# Patient Record
Sex: FEMALE | Race: WHITE | HISPANIC OR LATINO | Employment: FULL TIME | URBAN - METROPOLITAN AREA
[De-identification: names, ages, dates, MRNs, and addresses within clinical notes are randomized per-mention and may not be internally consistent; named-entity substitution may affect disease eponyms.]

---

## 2024-06-28 DIAGNOSIS — Z86.010 HX OF ADENOMATOUS POLYP OF COLON: Primary | ICD-10-CM

## 2024-07-08 ENCOUNTER — TELEPHONE (OUTPATIENT)
Dept: UROLOGY | Facility: HOSPITAL | Age: 59
End: 2024-07-08
Payer: COMMERCIAL

## 2024-07-09 ENCOUNTER — APPOINTMENT (OUTPATIENT)
Dept: UROLOGY | Facility: HOSPITAL | Age: 59
End: 2024-07-09
Payer: COMMERCIAL

## 2024-07-12 DIAGNOSIS — Z86.010 HX OF ADENOMATOUS POLYP OF COLON: Primary | ICD-10-CM

## 2024-07-12 RX ORDER — SODIUM, POTASSIUM,MAG SULFATES 17.5-3.13G
SOLUTION, RECONSTITUTED, ORAL ORAL
Qty: 354 ML | Refills: 0 | Status: SHIPPED | OUTPATIENT
Start: 2024-07-12

## 2024-07-15 ENCOUNTER — APPOINTMENT (OUTPATIENT)
Dept: UROLOGY | Facility: HOSPITAL | Age: 59
End: 2024-07-15
Payer: COMMERCIAL

## 2024-07-18 ENCOUNTER — APPOINTMENT (OUTPATIENT)
Dept: UROLOGY | Facility: HOSPITAL | Age: 59
End: 2024-07-18
Payer: COMMERCIAL

## 2024-07-29 NOTE — PROGRESS NOTES
[facility]  Outpatient Clinic Note    Patient Name:  Clarice Vick  MRN:  [id number]  :  1965  Date of Service: 2024     Visit type: Follow up visit    Last seen - 3/30/24 with Dr. Delia SEGOVIA Problem list/Chief complaints:  Gross hematuria- Cystoscopies negative  Recurrent UTIs    HPI    Interval History:  Clarice Vick is a 59 y.o. female who is being seen today for for follow up. I performed a detailed review of the medical chart records lab testing and imaging since last visit. Patient has no urinary complaints today. Denies hematuria, dysuria, flank pain, and bothersome frequency or urgency, no fever or chills.     IPSS: 2  QOL: 0      Past Medical History:   Diagnosis Date    Combined forms of age-related cataract, bilateral 2019    Combined form of age-related cataract, both eyes    Encounter for gynecological examination (general) (routine) without abnormal findings 2022    Encounter for well woman exam    Encounter for other screening for malignant neoplasm of breast     Screening breast examination    Encounter for screening for malignant neoplasm of colon 2019    Colon cancer screening    Personal history of other medical treatment 2022    History of screening mammography       Past Surgical History:   Procedure Laterality Date    MR HEAD ANGIO WO IV CONTRAST  3/3/2017    MR HEAD ANGIO WO IV CONTRAST 3/3/2017 [facility]CILLARY LEGAC[facility]       Social History     Socioeconomic History    Marital status:      Spouse name: Not on file    Number of children: Not on file    Years of education: Not on file    Highest education level: Not on file   Occupational History    Not on file   Tobacco Use    Smoking status: Never    Smokeless tobacco: Never   Substance and Sexual Activity    Alcohol use: Not on file    Drug use: Not on file    Sexual activity: Not on file   Other Topics Concern    Not on file   Social History Narrative    Not on file     Social Determinants  of Health     Financial Resource Strain: Not on file   Food Insecurity: Not on file   Transportation Needs: Not on file   Physical Activity: Not on file   Stress: Not on file   Social Connections: Not on file   Intimate Partner Violence: Not on file   Housing Stability: Not on file       No Known Allergies       Current Outpatient Medications:     estradiol (Estrace) 0.01 % (0.1 mg/gram) vaginal cream, Apply pea-sized amount 3 times per week nightly, Disp: 382.5 g, Rfl: 3     Review of system:  All other systems have been reviewed and are negative for complaints      Last recorded vitals:  Blood pressure 130/81.    Physical Exam:  General: Appears comfortable and in no apparent distress, well nourished  Head: Normocephalic, atraumatic  Eyes: Non-injected conjunctiva, sclera clear, no proptosis  Lungs: Breathing is easy, non-labored. Speaking in clear and complete sentences. Normal diaphragmatic movement.  Cardiovascular: no peripheral edema, cyanosis or pallor.   Abdomen: soft, non-distended, non-tender  : Bladder: non tender, not distended  MSK: Ambulatory with steady gait, unassisted  Skin: Poison ivy rash on bilateral arms  Neurologic: Alert, oriented to person, place, and time  Psychiatric: mood and affect appropriate      Labs  Lab Results   Component Value Date    GLUCOSE 102 (H) 01/20/2023    CALCIUM 9.9 01/20/2023     01/20/2023    K 4.6 01/20/2023    CO2 34 (H) 01/20/2023     01/20/2023    BUN 17 01/20/2023    CREATININE 0.81 01/20/2023     Office Visit on 08/02/2024   Component Date Value    POC Color, Urine 08/02/2024 Yellow     POC Appearance, Urine 08/02/2024 Clear     POC Glucose, Urine 08/02/2024 NEGATIVE     POC Bilirubin, Urine 08/02/2024 NEGATIVE     POC Ketones, Urine 08/02/2024 NEGATIVE     POC Specific Gravity, Ur* 08/02/2024 1.010     POC Blood, Urine 08/02/2024 MODERATE (2+) (A)     POC PH, Urine 08/02/2024 6.5     POC Protein, Urine 08/02/2024 NEGATIVE     POC Urobilinogen,  Urine 08/02/2024 0.2     Poc Nitrite, Urine 08/02/2024 NEGATIVE     POC Leukocytes, Urine 08/02/2024 SMALL (1+) (A)          Imaging  === 01/24/23 ===    CT UROGRAPHY WITH 3D VOLUME RENDERED IMAGING    - Impression -  1. Mild diffuse urinary bladder wall thickening with associated fat  stranding, concerning for cystitis for correlation with urinalysis..  2. Otherwise unremarkable CT urogram with no nephrolithiasis,  enhancing masses, intraluminal filling defect, or hydronephrosis.  3. Additional findings as above.    I personally reviewed the images/study and I agree with the findings  as stated. This study was interpreted at [facility], [place], [place].        Assessment and Plan:  Clarice Vick is a 59 y.o. female with history of cataracts, gross hematuria, UTIs who presents for follow up.    1.Today we discussed UTI in great detail. We discussed that a definitive diagnosis of UTI requires urine culture. Recurrent UTI occurs when treatment with antibiotics causes a positive urine culture to turn negative, but then culture becomes positive again. Recurrent UTI arises either from persistent bacteria inside of the  tract, ex/ urolithiasis, foreign bodies, diverticuli or bacterial prostatitis, or from bacteria outside the  tract, ex/ fistula, urinary retention, vesicoureteral reflux, immunosuppression, sexual intercourse).     We will send urine today for analysis and culture. Will treat as clinically indicated. A standing urine culture has been placed in the  system. If you develop symptoms of UTI, please go to any  lab and drop a urine specimen. You will be contacted with results 2-3 days after drop off.    We discussed UTI prevention in great detail. It is recommended to increase daily water intake to at least 80 ounces per day. It is important to have smooth, daily bowel movements and good bowel health. If you are not, you may take Miralax 17g daily to help with bowel  movements. Do not take this if you are having watery or loose stools. You may consider taking a cranberry supplement or D-Mannose daily to prevent bacteria from adhering to your bladder wall.     I recommend taking a daily probiotic for good vaginal fredi health. We may need to improve the quality of your vaginal tissues, to prevent bacteria from getting into your bladder. We discussed use vaginal estrogen cream. Will try Estrace cream, explained. She denies personal history of breast or gynecological cancer. I explained that small amounts of estrogen can be detected in the blood with use of Estrace cream and that the patient should follow up regularly for breast and gynecological cancer screening.    2. We discussed the finding of asymptomatic microscopic hematuria. We reviewed the AUA guideline. We reviewed the definition of microhematuria as >3 RBCs/hpf on a microscopic evaluation of a single, properly collected urine specimen. We discussed the risk stratification of patients with microhematuria into low, intermediate, and high risk categories.      For low risk patients, we discussed shared decision-making to decide between repeat UA within 6 mo or proceeding with cystoscopy and renal US. For patients who elect for repeat UA, persistent microhematuria should be reclassified as intermediate or high risk.     Plan:  -UA, microscopic, culture and sensitivity ordered to rule out infection or microhematuria- will call with results  -Prescription for Estrace cream sent to patient's pharmacy    Follow-up in 6 months, or sooner if needed, to reassess symptoms and for medication refill.    All questions and concerns were addressed. Patient verbalizes understanding and has no other questions at this time.     Some elements copied from Dr. Lin's note on 3/30/23, the elements have been updated and all reflect current decision making from today, 08/02/24    E&M visit today is associated with current or anticipated ongoing  medical care services related to a patient's single, serious condition or a complex condition.    CRISTINA Esposito-CNP   [facility]  08/02/24 10:11 AM

## 2024-08-02 ENCOUNTER — OFFICE VISIT (OUTPATIENT)
Dept: UROLOGY | Facility: HOSPITAL | Age: 59
End: 2024-08-02
Payer: COMMERCIAL

## 2024-08-02 ENCOUNTER — LAB (OUTPATIENT)
Dept: LAB | Facility: LAB | Age: 59
End: 2024-08-02
Payer: COMMERCIAL

## 2024-08-02 VITALS — SYSTOLIC BLOOD PRESSURE: 130 MMHG | DIASTOLIC BLOOD PRESSURE: 81 MMHG

## 2024-08-02 DIAGNOSIS — Z87.440 HISTORY OF RECURRENT UTIS: ICD-10-CM

## 2024-08-02 DIAGNOSIS — R39.9 LOWER URINARY TRACT SYMPTOMS (LUTS): ICD-10-CM

## 2024-08-02 DIAGNOSIS — R31.0 GROSS HEMATURIA: Primary | ICD-10-CM

## 2024-08-02 LAB
POC APPEARANCE, URINE: CLEAR
POC BILIRUBIN, URINE: NEGATIVE
POC BLOOD, URINE: ABNORMAL
POC COLOR, URINE: YELLOW
POC GLUCOSE, URINE: NEGATIVE MG/DL
POC KETONES, URINE: NEGATIVE MG/DL
POC LEUKOCYTES, URINE: ABNORMAL
POC NITRITE,URINE: NEGATIVE
POC PH, URINE: 6.5 PH
POC PROTEIN, URINE: NEGATIVE MG/DL
POC SPECIFIC GRAVITY, URINE: 1.01
POC UROBILINOGEN, URINE: 0.2 EU/DL
RBC #/AREA URNS AUTO: NORMAL /HPF
SQUAMOUS #/AREA URNS AUTO: NORMAL /HPF
WBC #/AREA URNS AUTO: NORMAL /HPF

## 2024-08-02 PROCEDURE — 87086 URINE CULTURE/COLONY COUNT: CPT

## 2024-08-02 PROCEDURE — 99214 OFFICE O/P EST MOD 30 MIN: CPT | Performed by: NURSE PRACTITIONER

## 2024-08-02 PROCEDURE — 1036F TOBACCO NON-USER: CPT | Performed by: NURSE PRACTITIONER

## 2024-08-02 PROCEDURE — G2211 COMPLEX E/M VISIT ADD ON: HCPCS | Performed by: NURSE PRACTITIONER

## 2024-08-02 PROCEDURE — 81001 URINALYSIS AUTO W/SCOPE: CPT

## 2024-08-02 PROCEDURE — 81003 URINALYSIS AUTO W/O SCOPE: CPT | Performed by: NURSE PRACTITIONER

## 2024-08-02 RX ORDER — ESTRADIOL 0.1 MG/G
CREAM VAGINAL
Qty: 382.5 G | Refills: 3 | Status: SHIPPED | OUTPATIENT
Start: 2024-08-02

## 2024-08-02 ASSESSMENT — PAIN SCALES - GENERAL: PAINLEVEL: 0-NO PAIN

## 2024-08-03 LAB — BACTERIA UR CULT: NORMAL

## 2024-08-27 ENCOUNTER — HOSPITAL ENCOUNTER (OUTPATIENT)
Dept: GASTROENTEROLOGY | Facility: HOSPITAL | Age: 59
Setting detail: OUTPATIENT SURGERY
Discharge: HOME | End: 2024-08-27
Payer: COMMERCIAL

## 2024-08-27 VITALS
SYSTOLIC BLOOD PRESSURE: 98 MMHG | RESPIRATION RATE: 17 BRPM | DIASTOLIC BLOOD PRESSURE: 74 MMHG | WEIGHT: 117 LBS | BODY MASS INDEX: 21.53 KG/M2 | HEIGHT: 62 IN | OXYGEN SATURATION: 99 % | TEMPERATURE: 97.8 F | HEART RATE: 66 BPM

## 2024-08-27 DIAGNOSIS — Z86.010 HX OF ADENOMATOUS POLYP OF COLON: ICD-10-CM

## 2024-08-27 PROCEDURE — 3700000013 HC SEDATION LEVEL 5+ TIME - EACH ADDITIONAL 15 MINUTES

## 2024-08-27 PROCEDURE — 7100000010 HC PHASE TWO TIME - EACH INCREMENTAL 1 MINUTE

## 2024-08-27 PROCEDURE — 99153 MOD SED SAME PHYS/QHP EA: CPT | Performed by: INTERNAL MEDICINE

## 2024-08-27 PROCEDURE — 99152 MOD SED SAME PHYS/QHP 5/>YRS: CPT | Performed by: INTERNAL MEDICINE

## 2024-08-27 PROCEDURE — 3700000012 HC SEDATION LEVEL 5+ TIME - INITIAL 15 MINUTES 5/> YEARS

## 2024-08-27 PROCEDURE — 7100000009 HC PHASE TWO TIME - INITIAL BASE CHARGE

## 2024-08-27 PROCEDURE — 2500000004 HC RX 250 GENERAL PHARMACY W/ HCPCS (ALT 636 FOR OP/ED): Performed by: INTERNAL MEDICINE

## 2024-08-27 PROCEDURE — 45380 COLONOSCOPY AND BIOPSY: CPT | Performed by: INTERNAL MEDICINE

## 2024-08-27 RX ORDER — FENTANYL CITRATE 50 UG/ML
INJECTION, SOLUTION INTRAMUSCULAR; INTRAVENOUS AS NEEDED
Status: COMPLETED | OUTPATIENT
Start: 2024-08-27 | End: 2024-08-27

## 2024-08-27 RX ORDER — SODIUM CHLORIDE, SODIUM LACTATE, POTASSIUM CHLORIDE, CALCIUM CHLORIDE 600; 310; 30; 20 MG/100ML; MG/100ML; MG/100ML; MG/100ML
20 INJECTION, SOLUTION INTRAVENOUS CONTINUOUS
Status: DISCONTINUED | OUTPATIENT
Start: 2024-08-27 | End: 2024-08-28 | Stop reason: HOSPADM

## 2024-08-27 RX ORDER — MIDAZOLAM HYDROCHLORIDE 1 MG/ML
INJECTION, SOLUTION INTRAMUSCULAR; INTRAVENOUS AS NEEDED
Status: COMPLETED | OUTPATIENT
Start: 2024-08-27 | End: 2024-08-27

## 2024-08-27 ASSESSMENT — PAIN SCALES - GENERAL
PAINLEVEL_OUTOF10: 0 - NO PAIN

## 2024-08-27 ASSESSMENT — COLUMBIA-SUICIDE SEVERITY RATING SCALE - C-SSRS
6. HAVE YOU EVER DONE ANYTHING, STARTED TO DO ANYTHING, OR PREPARED TO DO ANYTHING TO END YOUR LIFE?: NO
1. IN THE PAST MONTH, HAVE YOU WISHED YOU WERE DEAD OR WISHED YOU COULD GO TO SLEEP AND NOT WAKE UP?: NO
2. HAVE YOU ACTUALLY HAD ANY THOUGHTS OF KILLING YOURSELF?: NO

## 2024-08-27 ASSESSMENT — PAIN - FUNCTIONAL ASSESSMENT
PAIN_FUNCTIONAL_ASSESSMENT: 0-10
PAIN_FUNCTIONAL_ASSESSMENT: 0-10

## 2024-08-27 NOTE — DISCHARGE INSTRUCTIONS
Patient Instructions after a Colonoscopy      The anesthetics, sedatives or narcotics which were given to you today will be acting in your body for the next 24 hours, so you might feel a little sleepy or groggy.  This feeling should slowly wear off. Carefully read and follow the instructions.     You received sedation today:  - Do not drive or operate any machinery or power tools of any kind.   - No alcoholic beverages today, not even beer or wine.  - Do not make any important decisions or sign any legal documents.  - No over the counter medications that contain alcohol or that may cause drowsiness.  - Do not make any important decisions or sign any legal documents.  - Make sure you have someone with you for first 24 hours.    While it is common to experience mild to moderate abdominal distention, gas, or belching after your procedure, if any of these symptoms occur following discharge from the GI Lab or within one week of having your procedure, call the [facility] to be advised whether a visit to your nearest Urgent Care or Emergency Department is indicated.  Take this paper with you if you go.     - If you develop an allergic reaction to the medications that were given during your procedure such as difficulty breathing, rash, hives, severe nausea, vomiting or lightheadedness.  - If you experience chest pain, shortness of breath, severe abdominal pain, fevers and chills.  -If you develop signs and symptoms of bleeding such as blood in your spit, if your stools turn black, tarry, or bloody  - If you have not urinated within 8 hours following your procedure.  - If your IV site becomes painful, red, inflamed, or looks infected.    If you received a biopsy/polypectomy/sphincterotomy the following instructions apply below:    __ Do not use Aspirin containing products, non-steroidal medications or anti-coagulants for one week following your procedure. (Examples of these types of medications are: Advil,  Arthrotec, Aleve, Coumadin, Ecotrin, Heparin, Ibuprofen, Indocin, Motrin, Naprosyn, Nuprin, Plavix, Vioxx, and Voltarin, or their generic forms.  This list is not all-inclusive.  Check with your physician or pharmacist before resuming medications.)   __ Eat a soft diet today.  Avoid foods that are poorly digested for the next 24 hours.  These foods would include: nuts, beans, lettuce, red meats, and fried foods. Start with liquids and advance your diet as tolerated, gradually work up to eating solids.   __ Do not have a Barium Study or Enema for one week.    Your physician recommends the additional following instructions:    -You have a contact number available for emergencies. The signs and symptoms of potential delayed complications were discussed with you. You may return to normal activities tomorrow.  -Resume your previous diet.  -Continue your present medications.   -We are waiting for your pathology results.  -Your physician has recommended a repeat colonoscopy (date to be determined after pending pathology results are reviewed) for surveillance based on pathology results.  -The findings and recommendations have been discussed with you.  -The findings and recommendations were discussed with your family.  - Please see Medication Reconciliation Form for new medication/medications prescribed.       If you experience any problems or have any questions following discharge from the GI Lab, please call:    Tracee Mann MD, MS  [phone]     Nurse Signature                                                                        Date___________________                                                                            Patient/Responsible Party Signature                                        Date___________________

## 2024-08-27 NOTE — H&P
"History Of Present Illness  Clarice Vick is a 59 y.o. female presenting for surveillance colonoscopy - TA on last colonoscopy 2019.     Past Medical History  Past Medical History:   Diagnosis Date    Combined forms of age-related cataract, bilateral 08/27/2019    Combined form of age-related cataract, both eyes    Encounter for gynecological examination (general) (routine) without abnormal findings 01/19/2022    Encounter for well woman exam    Encounter for other screening for malignant neoplasm of breast     Screening breast examination    Encounter for screening for malignant neoplasm of colon 07/26/2019    Colon cancer screening    Personal history of other medical treatment 01/19/2022    History of screening mammography     Surgical History  Past Surgical History:   Procedure Laterality Date    MR HEAD ANGIO WO IV CONTRAST  3/3/2017    MR HEAD ANGIO WO IV CONTRAST 3/3/2017 [facility]CILL[facility]GAC[facility]     Social History  She reports that she has never smoked. She has never used smokeless tobacco. No history on file for alcohol use and drug use.    Family History  No family history on file.     Allergies  No Known Allergies  Review of Systems  Pre-sedation Evaluation:  ASA Classification - ASA 2 - Patient with mild systemic disease with no functional limitations  Mallampati Score - II (hard and soft palate, upper portion of tonsils and uvula visible)    Physical Exam  Cardiovascular:      Rate and Rhythm: Normal rate and regular rhythm.   Pulmonary:      Breath sounds: Normal breath sounds. No wheezing.   Abdominal:      Palpations: Abdomen is soft.      Tenderness: There is no abdominal tenderness.          Last Recorded Vitals  Blood pressure 124/75, pulse 71, temperature 36.6 °C (97.8 °F), temperature source Temporal, resp. rate 18, height 1.575 m (5' 2\"), weight 53.1 kg (117 lb), SpO2 96%.     Assessment/Plan   Surveillance colonoscopy with moderate sedation     PTA/Current Medications:  (Not in a hospital " admission)    Current Outpatient Medications   Medication Sig Dispense Refill    estradiol (Estrace) 0.01 % (0.1 mg/gram) vaginal cream Apply pea-sized amount 3 times per week nightly 382.5 g 3     Current Facility-Administered Medications   Medication Dose Route Frequency Provider Last Rate Last Admin    lactated Ringer's infusion  20 mL/hr intravenous Continuous MD Tracee Rodriges MD

## 2024-09-10 LAB
LABORATORY COMMENT REPORT: NORMAL
PATH REPORT.FINAL DX SPEC: NORMAL
PATH REPORT.GROSS SPEC: NORMAL
PATH REPORT.TOTAL CANCER: NORMAL

## 2025-02-17 ENCOUNTER — APPOINTMENT (OUTPATIENT)
Dept: UROLOGY | Facility: CLINIC | Age: 60
End: 2025-02-17
Payer: COMMERCIAL

## 2025-02-17 ENCOUNTER — APPOINTMENT (OUTPATIENT)
Dept: UROLOGY | Facility: HOSPITAL | Age: 60
End: 2025-02-17
Payer: COMMERCIAL

## 2025-03-04 ENCOUNTER — APPOINTMENT (OUTPATIENT)
Dept: UROLOGY | Facility: CLINIC | Age: 60
End: 2025-03-04
Payer: COMMERCIAL

## 2025-03-25 ENCOUNTER — APPOINTMENT (OUTPATIENT)
Dept: OBSTETRICS AND GYNECOLOGY | Facility: CLINIC | Age: 60
End: 2025-03-25
Payer: COMMERCIAL

## 2025-04-09 ENCOUNTER — APPOINTMENT (OUTPATIENT)
Dept: UROLOGY | Facility: CLINIC | Age: 60
End: 2025-04-09
Payer: COMMERCIAL

## 2025-04-09 ENCOUNTER — APPOINTMENT (OUTPATIENT)
Dept: UROLOGY | Facility: HOSPITAL | Age: 60
End: 2025-04-09
Payer: COMMERCIAL

## 2025-04-15 ENCOUNTER — HOSPITAL ENCOUNTER (OUTPATIENT)
Dept: RADIOLOGY | Facility: CLINIC | Age: 60
Discharge: HOME | End: 2025-04-15
Payer: COMMERCIAL

## 2025-04-15 ENCOUNTER — APPOINTMENT (OUTPATIENT)
Dept: OBSTETRICS AND GYNECOLOGY | Facility: CLINIC | Age: 60
End: 2025-04-15
Payer: COMMERCIAL

## 2025-04-15 VITALS
BODY MASS INDEX: 22.82 KG/M2 | DIASTOLIC BLOOD PRESSURE: 74 MMHG | SYSTOLIC BLOOD PRESSURE: 110 MMHG | WEIGHT: 124 LBS | HEIGHT: 62 IN

## 2025-04-15 DIAGNOSIS — R31.9 HEMATURIA, UNSPECIFIED TYPE: ICD-10-CM

## 2025-04-15 DIAGNOSIS — Z12.31 SCREENING MAMMOGRAM FOR BREAST CANCER: ICD-10-CM

## 2025-04-15 DIAGNOSIS — Z01.419 WELL WOMAN EXAM WITH ROUTINE GYNECOLOGICAL EXAM: ICD-10-CM

## 2025-04-15 DIAGNOSIS — Z12.31 BREAST CANCER SCREENING BY MAMMOGRAM: ICD-10-CM

## 2025-04-15 LAB
POC APPEARANCE, URINE: CLEAR
POC BILIRUBIN, URINE: NEGATIVE
POC BLOOD, URINE: ABNORMAL
POC COLOR, URINE: YELLOW
POC GLUCOSE, URINE: NEGATIVE MG/DL
POC KETONES, URINE: NEGATIVE MG/DL
POC LEUKOCYTES, URINE: NEGATIVE
POC NITRITE,URINE: NEGATIVE
POC PH, URINE: 5 PH
POC PROTEIN, URINE: NEGATIVE MG/DL
POC SPECIFIC GRAVITY, URINE: 1.02
POC UROBILINOGEN, URINE: 0.2 EU/DL

## 2025-04-15 PROCEDURE — 1036F TOBACCO NON-USER: CPT | Performed by: OBSTETRICS & GYNECOLOGY

## 2025-04-15 PROCEDURE — 77067 SCR MAMMO BI INCL CAD: CPT | Performed by: STUDENT IN AN ORGANIZED HEALTH CARE EDUCATION/TRAINING PROGRAM

## 2025-04-15 PROCEDURE — 99396 PREV VISIT EST AGE 40-64: CPT | Performed by: OBSTETRICS & GYNECOLOGY

## 2025-04-15 PROCEDURE — 3008F BODY MASS INDEX DOCD: CPT | Performed by: OBSTETRICS & GYNECOLOGY

## 2025-04-15 PROCEDURE — 77067 SCR MAMMO BI INCL CAD: CPT

## 2025-04-15 PROCEDURE — 77063 BREAST TOMOSYNTHESIS BI: CPT | Performed by: STUDENT IN AN ORGANIZED HEALTH CARE EDUCATION/TRAINING PROGRAM

## 2025-04-15 PROCEDURE — 88175 CYTOPATH C/V AUTO FLUID REDO: CPT

## 2025-04-15 PROCEDURE — 81003 URINALYSIS AUTO W/O SCOPE: CPT | Performed by: OBSTETRICS & GYNECOLOGY

## 2025-04-15 ASSESSMENT — PAIN SCALES - GENERAL: PAINLEVEL_OUTOF10: 0-NO PAIN

## 2025-04-15 NOTE — PROGRESS NOTES
Chief Complaint   Patient presents with    Well Women Visit     Annual exam with pap smear and mammogram order    - brought urine, wants checked for hematuria    No  complaints  Chaperone declined     Patient presents for annual preventative exam.  Has a history of benign microscopic hematuria.  This was worked up by urologist including cystoscopy and MRI and workup was negative.  Discussed that blood present in the urine today but this is not unusual, given her history.  Patient is entirely asymptomatic.      Grandchildren - 10     REVIEW OF SYSTEMS    Please see HPI for reported pertinent positives, which would supersede this ROS    Constitutional:  Denies fever, chills, wt gain or loss, fatigue    Genito-Urinary:  Denies genital lesion or sores, vaginal dryness, itching  or pain.  No abnormal vaginal discharge or unexplained vaginal bleeding.  No dysuria, urinary incontinence or frequency.  Denies pelvic pain, dysmenorrhea or dyspareunia.    Eyes:  Denies vision changes, dryness  ENT:  No hearing loss, sinus pain or congestion, nosebleeds  Cardiovascular:  No chest pain or palpitations  Respiratory:  No SOB, cough, wheezing  GI:  No Nausea, vomiting, diarrhea, constipation, abdominal pain  Musculoskeletal:  No new back pain. joint pain, peripheral edema  Skin:  No rash or skin lesion  Neurologic:  No HA, numbness or dizziness  Psychiatric:  No new anxiety or depression  Endocrine:  No loss of hair or hirsutism  Hematologic/lymphatic:  No swollen lymph nodes.  No reported tendency for easy bruising or bleeding    Patient admits to no other systemic complaints      Vitals:    04/15/25 0939   BP: 110/74       PHYSICAL EXAM:    PSYCH:  Pt is alert, oriented and cooperative    SKIN: warm, dry, w/o lesion    HEAD AND FACE:  External inspection of eyes, ears, functional cranial nerves normal and intact    THYROID:  No thyromegaly    CARDIOVASCULAR:  Warm and well Perfused    PULMONARY:  No respiratory  distress    ABDOMEN:  soft, nontender.  No mass or organomegaly.      BREAST:  Breasts are symmetric to inspection and palpation.  No mass palpable bilaterally.  There is no axillary lymphadenopathy    PELVIC:    External genitalia, urethra, perianal region normal to inspection and palpation if indicated.  No inguinal LA    Vagina without lesions.    Cervix seen and visually normal      Bimanual exam:      No pelvic mass palpable    Uterus nontender, midline in pelvis    No adnexal masses or tenderness    Assessment/Plan    Diagnoses and all orders for this visit:  Well woman exam with routine gynecological exam  -     THINPREP PAP TEST  Breast cancer screening by mammogram  Hematuria, unspecified type  -     POCT UA Automated manually resulted

## 2025-04-29 LAB
CYTOLOGY CMNT CVX/VAG CYTO-IMP: NORMAL
LAB AP HPV GENOTYPE QUESTION: YES
LAB AP HPV HR: NORMAL
LABORATORY COMMENT REPORT: NORMAL
MENSTRUAL HX REPORTED: NORMAL
PATH REPORT.TOTAL CANCER: NORMAL